# Patient Record
Sex: MALE | URBAN - METROPOLITAN AREA
[De-identification: names, ages, dates, MRNs, and addresses within clinical notes are randomized per-mention and may not be internally consistent; named-entity substitution may affect disease eponyms.]

---

## 2019-08-19 ENCOUNTER — OFFICE VISIT (OUTPATIENT)
Dept: CARDIOLOGY CLINIC | Age: 69
End: 2019-08-19

## 2019-08-19 VITALS
HEART RATE: 83 BPM | SYSTOLIC BLOOD PRESSURE: 167 MMHG | HEIGHT: 66 IN | DIASTOLIC BLOOD PRESSURE: 84 MMHG | OXYGEN SATURATION: 98 % | BODY MASS INDEX: 24.75 KG/M2 | WEIGHT: 154 LBS

## 2019-08-19 DIAGNOSIS — E78.5 DYSLIPIDEMIA: ICD-10-CM

## 2019-08-19 DIAGNOSIS — I42.9 CARDIOMYOPATHY, UNSPECIFIED TYPE (HCC): Primary | ICD-10-CM

## 2019-08-19 DIAGNOSIS — I10 ESSENTIAL HYPERTENSION: ICD-10-CM

## 2019-08-19 DIAGNOSIS — Z79.01 WARFARIN ANTICOAGULATION: ICD-10-CM

## 2019-08-19 DIAGNOSIS — Z76.89 ESTABLISHING CARE WITH NEW DOCTOR, ENCOUNTER FOR: ICD-10-CM

## 2019-08-19 DIAGNOSIS — Z95.810 CARDIAC RESYNCHRONIZATION THERAPY DEFIBRILLATOR (CRT-D) IN PLACE: ICD-10-CM

## 2019-08-19 DIAGNOSIS — I48.0 PAROXYSMAL ATRIAL FIBRILLATION (HCC): ICD-10-CM

## 2019-08-19 RX ORDER — METOPROLOL SUCCINATE 50 MG/1
TABLET, EXTENDED RELEASE ORAL DAILY
COMMUNITY

## 2019-08-19 RX ORDER — LISINOPRIL 20 MG/1
TABLET ORAL DAILY
COMMUNITY

## 2019-08-19 RX ORDER — WARFARIN SODIUM 5 MG/1
5 TABLET ORAL DAILY
COMMUNITY
End: 2019-10-09 | Stop reason: ALTCHOICE

## 2019-08-19 RX ORDER — SPIRONOLACTONE 25 MG/1
TABLET ORAL DAILY
COMMUNITY

## 2019-08-19 NOTE — PATIENT INSTRUCTIONS
Patsy Krabbe will call to schedule your testing within 48 hours.      If you do not hear from her, then please call central scheduling at [phone] or [phone] [facility]    All testing/lab work is completed at [facility] [address] [place] [address]

## 2019-08-19 NOTE — PROGRESS NOTES
1. Have you been to the ER, urgent care clinic since your last visit? Hospitalized since your last visit? No    2. Have you seen or consulted any other health care providers outside of the [address] [place] [address] since your last visit? Include any pap smears or colon screening.  No

## 2019-08-24 PROBLEM — E78.5 DYSLIPIDEMIA: Status: ACTIVE | Noted: 2019-08-24

## 2019-08-24 PROBLEM — Z79.01 WARFARIN ANTICOAGULATION: Status: ACTIVE | Noted: 2019-08-24

## 2019-08-24 PROBLEM — I48.0 PAROXYSMAL ATRIAL FIBRILLATION (HCC): Status: ACTIVE | Noted: 2019-08-24

## 2019-08-24 PROBLEM — Z95.810 CARDIAC RESYNCHRONIZATION THERAPY DEFIBRILLATOR (CRT-D) IN PLACE: Status: ACTIVE | Noted: 2019-08-24

## 2019-08-24 PROBLEM — I10 ESSENTIAL HYPERTENSION: Status: ACTIVE | Noted: 2019-08-24

## 2019-08-24 NOTE — PROGRESS NOTES
Subjective:      Semaj Tristan is in the office today for cardiac evaluation. He has recently relocated to [place] with his wife's senior care in [place]. He is a 69-year-old man that has a history of permanent pacemaker implanted in 2008 which was upgraded to a ICD/CRT biventricular device in 2016. Most of the records are pending. Patient reports no history of prior myocardial infarction. He has had no chest pain or shortness of breath. He denies peripheral swelling. He has had no palpitations, near-syncope or syncope. He has no specific complaints in the office today. Patient's cardiac risk factors are dyslipidemia, questionable hypertension per patient. Patient Active Problem List    Diagnosis Date Noted    Cardiac resynchronization therapy defibrillator (CRT-D) in place 08/24/2019    Paroxysmal atrial fibrillation ([facility] [address] 08/24/2019    Essential hypertension 08/24/2019    Dyslipidemia 08/24/2019    Warfarin anticoagulation 08/24/2019     Current Outpatient Medications   Medication Sig Dispense Refill    lisinopril (PRINIVIL, ZESTRIL) 20 mg tablet Take  by mouth daily.  metoprolol succinate (TOPROL-XL) 50 mg XL tablet Take  by mouth daily.  spironolactone (ALDACTONE) 25 mg tablet Take  by mouth daily.  warfarin (COUMADIN) 5 mg tablet Take 5 mg by mouth daily. No Known Allergies  No past medical history on file. No past surgical history on file. No family history on file.   Social History     Tobacco Use   Smoking Status Never Smoker   Smokeless Tobacco Never Used          Review of Systems, additional:  Constitutional: negative  Eyes: negative  Respiratory: negative  Cardiovascular: negative  Gastrointestinal: negative  Musculoskeletal:negative  Neurological: negative  Behvioral/Psych: negative  Endocrine: negative  ENT: negative    Objective:     Visit Vitals  /84   Pulse 83   Ht 5' 6\" (1.676 m)   Wt 69.9 kg (154 lb)   SpO2 98%   BMI 24.86 kg/m²     General: alert, cooperative, no distress   Chest Wall: inspection normal - no chest wall deformities or tenderness, respiratory effort normal   Lung: clear to auscultation bilaterally   Heart:  normal rate and regular rhythm, S1 and S2 normal, no murmurs noted, no gallops noted, no JVD   Abdomen: soft, non-tender. Bowel sounds normal. No masses,  no organomegaly   Extremities: extremities normal, atraumatic, no cyanosis or edema Skin: no rashes   Neuro: alert, oriented, normal speech, no focal findings or movement disorder noted     EK2019. Ventricular paced, tracking intrinsic atrial activity    Assessment/Plan:       ICD-10-CM ICD-9-CM    1. Cardiomyopathy, unspecified type [facility]), records pending. Patient reports history of \"congestive heart failure \". Will order echocardiogram.  Return 4 to 6 weeks I42.9 425.4 ECHO ADULT COMPLETE   2. Establishing care with new doctor, encounter for Z76.89 V65.8 AMB POC EKG ROUTINE W/ 12 LEADS, INTER & REP   3. Cardiac resynchronization therapy defibrillator (CRT-D) in place, upgraded in  from previous pacemaker of . Will arrange interrogation Z95.810 V45.02    4. Paroxysmal atrial fibrillation (HCC),[id number] score 3, AT/AF noted in prior ICD checks I48.0 427.31    5. Dyslipidemia, to be followed by PCP E78.5 272.4    6. Essential hypertension, elevated systolic BP in the office today. I10 401.9    7.  Warfarin anticoagulation Z79.01 V58.61

## 2019-10-09 ENCOUNTER — CLINICAL SUPPORT (OUTPATIENT)
Dept: CARDIOLOGY CLINIC | Age: 69
End: 2019-10-09

## 2019-10-09 ENCOUNTER — OFFICE VISIT (OUTPATIENT)
Dept: CARDIOLOGY CLINIC | Age: 69
End: 2019-10-09

## 2019-10-09 VITALS
OXYGEN SATURATION: 98 % | HEART RATE: 73 BPM | BODY MASS INDEX: 24.86 KG/M2 | WEIGHT: 154 LBS | DIASTOLIC BLOOD PRESSURE: 81 MMHG | SYSTOLIC BLOOD PRESSURE: 151 MMHG

## 2019-10-09 DIAGNOSIS — I48.0 PAROXYSMAL ATRIAL FIBRILLATION (HCC): ICD-10-CM

## 2019-10-09 DIAGNOSIS — Z95.810 CARDIAC RESYNCHRONIZATION THERAPY DEFIBRILLATOR (CRT-D) IN PLACE: ICD-10-CM

## 2019-10-09 DIAGNOSIS — I42.9 CARDIOMYOPATHY, UNSPECIFIED TYPE (HCC): Primary | ICD-10-CM

## 2019-10-09 RX ORDER — ATORVASTATIN CALCIUM 40 MG/1
TABLET, FILM COATED ORAL DAILY
COMMUNITY

## 2019-10-09 NOTE — PROGRESS NOTES
1. Have you been to the ER, urgent care clinic since your last visit? Hospitalized since your last visit? No     2. Have you seen or consulted any other health care providers outside of the [address] [place] [address] since your last visit? Include any pap smears or colon screening.   No

## 2019-10-13 NOTE — PROGRESS NOTES
Subjective:      John Real is in the office today for cardiac evaluation. He has recently relocated to [place] after his wife's recent intermediate in [place]. He is a 49-year-old man that has a history of permanent pacemaker implanted in 2008 which was upgraded to a ICD/CRT [employer] biventricular device in 2016. The patient did bring a copy of his device paraphernalia today. In the office today, the patient says he feels \"all right\". He walks in his large yard for exercise. He keeps up with all of the yard work. He does so without limiting symptoms. He denies shortness of breath, PND or orthopnea. He has had no peripheral swelling. He has had no chest pain. Patient's cardiac risk factors are dyslipidemia, questionable hypertension per patient. Patient Active Problem List    Diagnosis Date Noted    Cardiac resynchronization therapy defibrillator (CRT-D) in place 08/24/2019    Paroxysmal atrial fibrillation ([facility] [address] 08/24/2019    Essential hypertension 08/24/2019    Dyslipidemia 08/24/2019    Warfarin anticoagulation 08/24/2019     Current Outpatient Medications   Medication Sig Dispense Refill    apixaban (ELIQUIS) 5 mg tablet Take 5 mg by mouth two (2) times a day.  atorvastatin (LIPITOR) 40 mg tablet Take  by mouth daily.  lisinopril (PRINIVIL, ZESTRIL) 20 mg tablet Take  by mouth daily.  metoprolol succinate (TOPROL-XL) 50 mg XL tablet Take  by mouth daily.  spironolactone (ALDACTONE) 50 mg tablet Take  by mouth daily. No Known Allergies  No past medical history on file. No past surgical history on file. No family history on file.   Social History     Tobacco Use   Smoking Status Never Smoker   Smokeless Tobacco Never Used          Review of Systems, additional:  Constitutional: negative  Eyes: negative  Respiratory: negative  Cardiovascular: negative  Gastrointestinal: negative  Musculoskeletal:negative  Neurological: negative  Behvioral/Psych: negative  Endocrine: negative  ENT: negative    Objective:     Visit Vitals  /81   Pulse 73   Wt 69.9 kg (154 lb)   SpO2 98%   BMI 24.86 kg/m²     General:  alert, cooperative, no distress   Chest Wall: inspection normal - no chest wall deformities or tenderness, respiratory effort normal   Lung: clear to auscultation bilaterally   Heart:  normal rate and regular rhythm, S1 and S2 normal, no murmurs noted, no gallops noted, no JVD   Abdomen: soft, non-tender. Bowel sounds normal. No masses,  no organomegaly   Extremities: extremities normal, atraumatic, no cyanosis or edema Skin: no rashes   Neuro: alert, oriented, normal speech, no focal findings or movement disorder noted     EK2019. Ventricular paced, tracking intrinsic atrial activity    Assessment/Plan:       ICD-10-CM ICD-9-CM    1. Cardiomyopathy, unspecified type [facility]), records pending. Patient reports history of \"congestive heart failure \". Echocardiogram ordered and completed on 2019. Ejection fraction 40%. Grade 2 diastolic dysfunction. Return in 6 months I42.9 425.4 ECHO ADULT COMPLETE   2. Establishing care with new doctor, encounter for Z76.89 V65.8 AMB POC EKG ROUTINE W/ 12 LEADS, INTER & REP   3. Cardiac resynchronization therapy defibrillator (CRT-D) in place, upgraded in  from previous pacemaker of . [place] [employer] device interrogated in the office today. Patient has known AT/AF and is on Coumadin therapy Z95.810 V45.02    4. Paroxysmal atrial fibrillation (HCC),[id number] score 3, AT/AF noted in prior ICD checks I48.0 427.31    5. Dyslipidemia, to be followed by PCP. Lipid profile 2019; cholesterol 293, , triglycerides 109, and HDL 66. The patient is on atorvastatin 40 mg daily. Would consider doubling of dosage or switch to Crestor. E78.5 272.4    6. Essential hypertension, mildly elevated systolic BP in the office today. I10 401.9    7.  Warfarin anticoagulation Z79.01 V58.61

## 2020-10-14 ENCOUNTER — OFFICE VISIT (OUTPATIENT)
Dept: CARDIOLOGY CLINIC | Age: 70
End: 2020-10-14
Payer: MEDICARE

## 2020-10-14 VITALS
BODY MASS INDEX: 24.86 KG/M2 | DIASTOLIC BLOOD PRESSURE: 72 MMHG | SYSTOLIC BLOOD PRESSURE: 120 MMHG | OXYGEN SATURATION: 98 % | HEART RATE: 67 BPM | TEMPERATURE: 97.7 F | WEIGHT: 154 LBS

## 2020-10-14 DIAGNOSIS — I42.9 CARDIOMYOPATHY, UNSPECIFIED TYPE (HCC): ICD-10-CM

## 2020-10-14 DIAGNOSIS — R06.09 DOE (DYSPNEA ON EXERTION): Primary | ICD-10-CM

## 2020-10-14 DIAGNOSIS — I48.0 PAROXYSMAL ATRIAL FIBRILLATION (HCC): ICD-10-CM

## 2020-10-14 PROCEDURE — G8536 NO DOC ELDER MAL SCRN: HCPCS | Performed by: INTERNAL MEDICINE

## 2020-10-14 PROCEDURE — 99214 OFFICE O/P EST MOD 30 MIN: CPT | Performed by: INTERNAL MEDICINE

## 2020-10-14 PROCEDURE — G8754 DIAS BP LESS 90: HCPCS | Performed by: INTERNAL MEDICINE

## 2020-10-14 PROCEDURE — G8752 SYS BP LESS 140: HCPCS | Performed by: INTERNAL MEDICINE

## 2020-10-14 PROCEDURE — G8420 CALC BMI NORM PARAMETERS: HCPCS | Performed by: INTERNAL MEDICINE

## 2020-10-14 PROCEDURE — G8427 DOCREV CUR MEDS BY ELIG CLIN: HCPCS | Performed by: INTERNAL MEDICINE

## 2020-10-14 PROCEDURE — G8510 SCR DEP NEG, NO PLAN REQD: HCPCS | Performed by: INTERNAL MEDICINE

## 2020-10-14 NOTE — PROGRESS NOTES
Cecelia Marr presents today for   Chief Complaint   Patient presents with   [address] preferred language for health care discussion is english/other. Is someone accompanying this pt? no  Is the patient using any DME equipment during [address]? no    Depression Screening:  3 most recent PHQ Screens 10/9/2019   Little interest or pleasure in doing things Not at all   Feeling down, depressed, irritable, or hopeless Not at all   Total Score PHQ 2 0       Learning Assessment:  Learning Assessment 8/19/2019   PRIMARY LEARNER Patient   PRIMARY LANGUAGE ENGLISH   LEARNER PREFERENCE PRIMARY DEMONSTRATION   ANSWERED BY pt   RELATIONSHIP SELF       Abuse Screening:  Completed    Fall Risk  Fall Risk Assessment, last 12 mths 10/9/2019   Able to walk? Yes   Fall in past 12 months? No       Pt currently taking Anticoagulant therapy? no    Coordination of Care:  1. Have you been to the ER, urgent care clinic since your last visit? Hospitalized since your last visit? no    2. Have you seen or consulted any other health care providers outside of the [address] [place] [address] [place] since your last visit? Include any pap smears or colon screening.  no

## 2020-10-14 NOTE — PATIENT INSTRUCTIONS
Testing Echo Please call [facility] scheduling at [phone]  to schedule an appointment. All testing is completed at [address] [facility] TO BE COMPLETED IN [facility] 245[facility] before next appointment

## 2020-10-25 NOTE — PROGRESS NOTES
Subjective:      Vijay Velasco is in the office today for cardiac re-evaluation. He  relocated to [place] after his wife's  nursing home in [place] last year. He is a 66-year-old man that has a history of permanent pacemaker implant in 2008 which was upgraded to a ICD/CRT [employer] biventricular device in 2016. In the office today, the patient says he is feeling all right. He walks in his large yard for exercise. He mows the grass. He does so without limiting symptoms. He denies shortness of breath, PND or orthopnea. He has had no peripheral swelling. He has had no chest pain. He has had no palpitations. Patient's cardiac risk factors are dyslipidemia, questionable hypertension per patient. Patient Active Problem List    Diagnosis Date Noted    Cardiac resynchronization therapy defibrillator (CRT-D) in place 08/24/2019    Paroxysmal atrial fibrillation ([facility] [address] 08/24/2019    Essential hypertension 08/24/2019    Dyslipidemia 08/24/2019    Warfarin anticoagulation 08/24/2019     Current Outpatient Medications   Medication Sig Dispense Refill    apixaban (ELIQUIS) 5 mg tablet Take 5 mg by mouth two (2) times a day.  atorvastatin (LIPITOR) 80 mg tablet Take  by mouth daily.  lisinopril (PRINIVIL, ZESTRIL) 20 mg tablet Take  by mouth daily.  metoprolol succinate (TOPROL-XL) 50 mg XL tablet Take  by mouth daily.  spironolactone (ALDACTONE) 50 mg tablet Take  by mouth daily. No Known Allergies  No past medical history on file. No past surgical history on file. No family history on file.   Social History     Tobacco Use   Smoking Status Never Smoker   Smokeless Tobacco Never Used          Review of Systems, additional:  Constitutional: negative  Eyes: negative  Respiratory: negative  Cardiovascular: negative  Gastrointestinal: negative  Musculoskeletal:negative  Neurological: negative  Behvioral/Psych: negative  Endocrine: negative  ENT: negative    Objective:     Visit Vitals  /72   Pulse 67   Temp 97.7 °F (36.5 °C) (Temporal)   Wt 154 lb (69.9 kg)   SpO2 98%   BMI 24.86 kg/m²     General:  alert, cooperative, no distress   Chest Wall: inspection normal - no chest wall deformities or tenderness, respiratory effort normal   Lung: clear to auscultation bilaterally   Heart:  normal rate and regular rhythm, S1 and S2 normal, no murmurs noted, no gallops noted, no JVD   Abdomen: soft, non-tender. Bowel sounds normal. No masses,  no organomegaly   Extremities: extremities normal, atraumatic, no cyanosis or edema Skin: no rashes   Neuro: alert, oriented, normal speech, no focal findings or movement disorder noted     EK2019. Ventricular paced, tracking intrinsic atrial activity    Assessment/Plan:       ICD-10-CM ICD-9-CM    1. Cardiomyopathy, unspecified type [facility]), records pending. Patient reports history of \"congestive heart failure \". Echocardiogram ordered and completed on 2019. Ejection fraction 40%. Grade 2 diastolic dysfunction. Will repeat echocardiogram prior to next visit in 6 months  I42.9 425.4 ECHO ADULT COMPLETE          2. Cardiac resynchronization therapy defibrillator (CRT-D) in place, upgraded in  from previous pacemaker of . [employer] device. Patient has known AT/AF and is on Eliquis  Z95.810 V45.02    3. Paroxysmal atrial fibrillation (HCC),[id number] score 3, AT/AF noted in prior ICD checks I48.0 427.31    4. Dyslipidemia,  followed by PCP. Lipid profile 2019; cholesterol 293, , triglycerides 109, and HDL 66.  Patient on Lipitor 80 mg daily E78.5 272.4    5. Essential hypertension, controlled  BP in the office today.  I10 401.9

## 2020-11-17 ENCOUNTER — CLINICAL SUPPORT (OUTPATIENT)
Dept: CARDIOLOGY CLINIC | Age: 70
End: 2020-11-17
Payer: MEDICARE

## 2020-11-17 DIAGNOSIS — I42.9 CARDIOMYOPATHY, UNSPECIFIED TYPE (HCC): Primary | ICD-10-CM

## 2020-11-17 DIAGNOSIS — Z95.810 CARDIAC RESYNCHRONIZATION THERAPY DEFIBRILLATOR (CRT-D) IN PLACE: ICD-10-CM

## 2020-11-17 PROCEDURE — 93284 PRGRMG EVAL IMPLANTABLE DFB: CPT | Performed by: INTERNAL MEDICINE

## 2021-05-12 ENCOUNTER — OFFICE VISIT (OUTPATIENT)
Dept: CARDIOLOGY CLINIC | Age: 71
End: 2021-05-12
Payer: MEDICARE

## 2021-05-12 VITALS
OXYGEN SATURATION: 98 % | RESPIRATION RATE: 16 BRPM | TEMPERATURE: 98 F | SYSTOLIC BLOOD PRESSURE: 140 MMHG | HEIGHT: 66 IN | WEIGHT: 153 LBS | DIASTOLIC BLOOD PRESSURE: 79 MMHG | BODY MASS INDEX: 24.59 KG/M2 | HEART RATE: 98 BPM

## 2021-05-12 DIAGNOSIS — R06.09 DOE (DYSPNEA ON EXERTION): Primary | ICD-10-CM

## 2021-05-12 DIAGNOSIS — I42.9 CARDIOMYOPATHY, UNSPECIFIED TYPE (HCC): ICD-10-CM

## 2021-05-12 PROCEDURE — G8510 SCR DEP NEG, NO PLAN REQD: HCPCS | Performed by: INTERNAL MEDICINE

## 2021-05-12 PROCEDURE — 3017F COLORECTAL CA SCREEN DOC REV: CPT | Performed by: INTERNAL MEDICINE

## 2021-05-12 PROCEDURE — G8427 DOCREV CUR MEDS BY ELIG CLIN: HCPCS | Performed by: INTERNAL MEDICINE

## 2021-05-12 PROCEDURE — 99214 OFFICE O/P EST MOD 30 MIN: CPT | Performed by: INTERNAL MEDICINE

## 2021-05-12 PROCEDURE — G8536 NO DOC ELDER MAL SCRN: HCPCS | Performed by: INTERNAL MEDICINE

## 2021-05-12 PROCEDURE — G8754 DIAS BP LESS 90: HCPCS | Performed by: INTERNAL MEDICINE

## 2021-05-12 PROCEDURE — G8753 SYS BP > OR = 140: HCPCS | Performed by: INTERNAL MEDICINE

## 2021-05-12 PROCEDURE — G8420 CALC BMI NORM PARAMETERS: HCPCS | Performed by: INTERNAL MEDICINE

## 2021-05-12 PROCEDURE — 1101F PT FALLS ASSESS-DOCD LE1/YR: CPT | Performed by: INTERNAL MEDICINE

## 2021-05-12 NOTE — PROGRESS NOTES
Sarah Salomon presents today for   Chief Complaint   Patient presents with   [address] 56 preferred language for health care discussion is english/other. Personal Protective Equipment:   Personal Protective Equipment was used including: mask-surgical and hands-gloves. Patient was placed on no precaution(s). Patient was masked. Precautions:   Patient currently on None  Patient currently roomed with door closed    Is someone accompanying this pt? no    Is the patient using any DME equipment during [address]? no    Depression Screening:  3 most recent PHQ Screens 5/12/2021   Little interest or pleasure in doing things Not at all   Feeling down, depressed, irritable, or hopeless Not at all   Total Score PHQ 2 0       Learning Assessment:  Learning Assessment 8/19/2019   PRIMARY LEARNER Patient   PRIMARY LANGUAGE ENGLISH   LEARNER PREFERENCE PRIMARY DEMONSTRATION   ANSWERED BY pt   RELATIONSHIP SELF       Abuse Screening:  Abuse Screening Questionnaire 10/14/2020   Do you ever feel afraid of your partner? N   Are you in a relationship with someone who physically or mentally threatens you? N   Is it safe for you to go home? Y       Fall Risk  Fall Risk Assessment, last 12 mths 5/12/2021   Able to walk? Yes   Fall in past 12 months? 0   Do you feel unsteady? 0   Are you worried about falling 0       Pt currently taking Anticoagulant therapy? no    Coordination of Care:  1. Have you been to the ER, urgent care clinic since your last visit? Hospitalized since your last visit? no    2. Have you seen or consulted any other health care providers outside of the [address] [place] [address] since your last visit? Include any pap smears or colon screening.   yes

## 2021-05-12 NOTE — LETTER
5/12/2021 11:46 AM 
 
Mr. Adrienne Lo [address] [facility] [address] [address] Adrienne Wagner was seen in our office on 5/12/21 for cardiac evaluation for surgery. From a cardiac standpoint he low risk. Patient  may stop eliquis for three (3) days. Please feel free to contact our office if you have any questions regarding this patient. Sincerely, Kenzie Cuba MD

## 2021-05-17 NOTE — PROGRESS NOTES
Subjective:      Carolyn Goldstein is in the office today for cardiac re-evaluation. He  relocated to [facility] after his wife's  custodial in [place] last year. He is a 61-year-old man that has a history of permanent pacemaker implant in 2008 which was upgraded to a ICD/CRT [employer] biventricular device in 2016. In the office today, the patient says he has had no chest pain or exertional dyspnea. He has had no PND or orthopnea. He has had no peripheral swelling. He has had no palpitations, near-syncope or syncope. He will walks daily without stopping. He mows his own lawn. He does these things without limiting dyspnea. Patient's cardiac risk factors are dyslipidemia, questionable hypertension per patient. Patient Active Problem List    Diagnosis Date Noted    Cardiac resynchronization therapy defibrillator (CRT-D) in place 08/24/2019    Paroxysmal atrial fibrillation ([facility] [address] 08/24/2019    Essential hypertension 08/24/2019    Dyslipidemia 08/24/2019    Warfarin anticoagulation 08/24/2019     Current Outpatient Medications   Medication Sig Dispense Refill    apixaban (ELIQUIS) 5 mg tablet Take 5 mg by mouth two (2) times a day.  atorvastatin (LIPITOR) 40 mg tablet Take  by mouth daily.  lisinopril (PRINIVIL, ZESTRIL) 20 mg tablet Take  by mouth daily.  metoprolol succinate (TOPROL-XL) 50 mg XL tablet Take  by mouth daily.  spironolactone (ALDACTONE) 25 mg tablet Take  by mouth daily. No Known Allergies  No past medical history on file. No past surgical history on file. No family history on file.   Social History     Tobacco Use   Smoking Status Never Smoker   Smokeless Tobacco Never Used          Review of Systems, additional:  Constitutional: negative  Eyes: negative  Respiratory: negative  Cardiovascular: negative  Gastrointestinal: negative  Musculoskeletal:negative  Neurological: negative  Behvioral/Psych: negative  Endocrine: negative  ENT: negative    Objective:     Visit Vitals  BP (!) 140/79 (BP 1 Location: Left upper arm, BP Patient Position: Sitting, BP Cuff Size: Large adult)   Pulse 98   Temp 98 °F (36.7 °C) (Temporal)   Resp 16   Ht 5' 6\" (1.676 m)   Wt 69.4 kg (153 lb)   SpO2 98%   BMI 24.69 kg/m²     General:  alert, cooperative, no distress   Chest Wall: inspection normal - no chest wall deformities or tenderness, respiratory effort normal   Lung: clear to auscultation bilaterally   Heart:  normal rate and regular rhythm, S1 and S2 normal, no murmurs noted, no gallops noted, no JVD   Abdomen: soft, non-tender. Bowel sounds normal. No masses,  no organomegaly   Extremities: extremities normal, atraumatic, no cyanosis or edema Skin: no rashes   Neuro: alert, oriented, normal speech, no focal findings or movement disorder noted     EK2019. Ventricular paced, tracking intrinsic atrial activity    Assessment/Plan:       ICD-10-CM ICD-9-CM    1. Cardiomyopathy, unspecified type [facility]), records pending. Patient reports history of \"congestive heart failure \". Echocardiogram ordered and completed on 2019. Ejection fraction 40%. Grade 2 diastolic dysfunction. He was supposed to be scheduled for an echocardiogram prior to this visit. Will order echo prior to next visit in 6 months. I42.9 425.4 ECHO ADULT COMPLETE          2. Cardiac resynchronization therapy defibrillator (CRT-D) in place, last interrogation 2020. [employer] device. AT/AF less than 1%. Longevity 7 years Z95.810 V45.02    3. Paroxysmal atrial fibrillation (HCC),[id number] score 3, AT/AF noted in prior ICD checks I48.0 427.31    4. Dyslipidemia,  followed by PCP. Patient on Lipitor 80 mg daily E78.5 272.4    5. Essential hypertension, controlled  BP in the office today. I10 401.9           6. Preoperative clearance, no cardiac contraindication to proposed cataract surgery.   Patient may be off Eliquis for 3 days prior to the procedure

## 2021-06-15 ENCOUNTER — CLINICAL SUPPORT (OUTPATIENT)
Dept: CARDIOLOGY CLINIC | Age: 71
End: 2021-06-15
Payer: MEDICARE

## 2021-06-15 DIAGNOSIS — I42.9 CARDIOMYOPATHY, UNSPECIFIED TYPE (HCC): ICD-10-CM

## 2021-06-15 DIAGNOSIS — Z95.810 CARDIAC RESYNCHRONIZATION THERAPY DEFIBRILLATOR (CRT-D) IN PLACE: Primary | ICD-10-CM

## 2021-06-15 PROCEDURE — 93284 PRGRMG EVAL IMPLANTABLE DFB: CPT | Performed by: INTERNAL MEDICINE

## 2021-11-12 ENCOUNTER — OFFICE VISIT (OUTPATIENT)
Dept: CARDIOLOGY CLINIC | Age: 71
End: 2021-11-12
Payer: MEDICARE

## 2021-11-12 VITALS
HEIGHT: 66 IN | TEMPERATURE: 97.9 F | DIASTOLIC BLOOD PRESSURE: 65 MMHG | SYSTOLIC BLOOD PRESSURE: 118 MMHG | OXYGEN SATURATION: 98 % | WEIGHT: 151 LBS | BODY MASS INDEX: 24.27 KG/M2 | HEART RATE: 62 BPM

## 2021-11-12 DIAGNOSIS — I48.0 PAROXYSMAL ATRIAL FIBRILLATION (HCC): Primary | ICD-10-CM

## 2021-11-12 PROCEDURE — G8752 SYS BP LESS 140: HCPCS | Performed by: INTERNAL MEDICINE

## 2021-11-12 PROCEDURE — G8432 DEP SCR NOT DOC, RNG: HCPCS | Performed by: INTERNAL MEDICINE

## 2021-11-12 PROCEDURE — G8420 CALC BMI NORM PARAMETERS: HCPCS | Performed by: INTERNAL MEDICINE

## 2021-11-12 PROCEDURE — G8536 NO DOC ELDER MAL SCRN: HCPCS | Performed by: INTERNAL MEDICINE

## 2021-11-12 PROCEDURE — 1101F PT FALLS ASSESS-DOCD LE1/YR: CPT | Performed by: INTERNAL MEDICINE

## 2021-11-12 PROCEDURE — 99214 OFFICE O/P EST MOD 30 MIN: CPT | Performed by: INTERNAL MEDICINE

## 2021-11-12 PROCEDURE — G8754 DIAS BP LESS 90: HCPCS | Performed by: INTERNAL MEDICINE

## 2021-11-12 PROCEDURE — G8427 DOCREV CUR MEDS BY ELIG CLIN: HCPCS | Performed by: INTERNAL MEDICINE

## 2021-11-12 PROCEDURE — 3017F COLORECTAL CA SCREEN DOC REV: CPT | Performed by: INTERNAL MEDICINE

## 2021-11-12 NOTE — PROGRESS NOTES
Magda Schultz presents today for   Chief Complaint   Patient presents with    Follow-up     6 month       Is someone accompanying this pt? no    Is the patient using any DME equipment during OV? no    Depression Screening:  3 most recent PHQ Screens 5/12/2021   Little interest or pleasure in doing things Not at all   Feeling down, depressed, irritable, or hopeless Not at all   Total Score PHQ 2 0       Learning Assessment:  Learning Assessment 8/19/2019   PRIMARY LEARNER Patient   PRIMARY LANGUAGE ENGLISH   LEARNER PREFERENCE PRIMARY DEMONSTRATION   ANSWERED BY pt   RELATIONSHIP SELF       Abuse Screening:  Abuse Screening Questionnaire 10/14/2020   Do you ever feel afraid of your partner? N   Are you in a relationship with someone who physically or mentally threatens you? N   Is it safe for you to go home? Y       Fall Risk  Fall Risk Assessment, last 12 mths 11/12/2021   Able to walk? Yes   Fall in past 12 months? 0   Do you feel unsteady? 0   Are you worried about falling 0       OPIOID RISK TOOL  No flowsheet data found. Coordination of Care:  1. Have you been to the ER, urgent care clinic since your last visit? Cataract Surgery July 7th  Hospitalized since your last visit? no    2. Have you seen or consulted any other health care providers outside of the [address] [place] [address] since your last visit? no Include any pap smears or colon screening.  no

## 2021-11-20 NOTE — PROGRESS NOTES
Subjective:      Merari Licea is in the office today for cardiac re-evaluation. He  relocated to [place] after his wife's  group home in [place] last year. He is a 64-year-old man that has a history of permanent pacemaker implant in 2008 which was upgraded to a ICD/CRT [employer] [employer] biventricular device in 2016. In the office today, the patient says he has had no chest pain or exertional dyspnea. He reports that he is doing well. He walks around the park near his house and in his backyard regularly. He has had no chest pain or shortness of breath. He has had no peripheral swelling. He recently underwent his cataract extraction without complication. He has no specific complaints in the office today. Patient Active Problem List    Diagnosis Date Noted    Cardiac resynchronization therapy defibrillator (CRT-D) in place 08/24/2019    Paroxysmal atrial fibrillation ([facility] [address] 08/24/2019    Essential hypertension 08/24/2019    Dyslipidemia 08/24/2019    Warfarin anticoagulation 08/24/2019     Current Outpatient Medications   Medication Sig Dispense Refill    apixaban (ELIQUIS) 5 mg tablet Take 5 mg by mouth two (2) times a day.  atorvastatin (LIPITOR) 40 mg tablet Take  by mouth daily.  lisinopril (PRINIVIL, ZESTRIL) 20 mg tablet Take  by mouth daily.  metoprolol succinate (TOPROL-XL) 50 mg XL tablet Take  by mouth daily.  spironolactone (ALDACTONE) 25 mg tablet Take  by mouth daily. No Known Allergies  No past medical history on file. No past surgical history on file. No family history on file.   Social History     Tobacco Use   Smoking Status Never Smoker   Smokeless Tobacco Never Used          Review of Systems, additional:  Constitutional: negative  Eyes: negative  Respiratory: negative  Cardiovascular: negative  Gastrointestinal: negative  Musculoskeletal:negative  Neurological: negative  Behvioral/Psych: negative  Endocrine: negative  ENT: negative    Objective: Visit Vitals  /65 (BP 1 Location: Left arm, BP Patient Position: Sitting, BP Cuff Size: Adult)   Pulse 62   Temp 97.9 °F (36.6 °C) (Temporal)   Ht 5' 6\" (1.676 m)   Wt 68.5 kg (151 lb)   SpO2 98%   BMI 24.37 kg/m²     General:  alert, cooperative, no distress   Chest Wall: inspection normal - no chest wall deformities or tenderness, respiratory effort normal   Lung: clear to auscultation bilaterally   Heart:  normal rate and regular rhythm, S1 and S2 normal, no murmurs noted, no gallops noted, no JVD   Abdomen: soft, non-tender. Bowel sounds normal. No masses,  no organomegaly   Extremities: extremities normal, atraumatic, no cyanosis or edema Skin: no rashes   Neuro: alert, oriented, normal speech, no focal findings or movement disorder noted     EK2019. Ventricular paced, tracking intrinsic atrial activity    Assessment/Plan:       ICD-10-CM ICD-9-CM    1. Cardiomyopathy, unspecified type [facility]), records pending. Patient reports history of \"congestive heart failure \". Echocardiogram ordered and completed on 2019. Ejection fraction 40%. Grade 2 diastolic dysfunction. Echocardiogram repeated on 6/10/2021. EF 45 to 50%. Moderate AI. Mild MR. Continue present cardiac Rx and will see patient back in 6 months I42.9 425.4 ECHO ADULT COMPLETE          2. Cardiac resynchronization therapy defibrillator (CRT-D) in place, last interrogation 6/15/2021. [employer] device. AT/AF less than 1%. Longevity 6.5 years Z95.810 V45.02    3. Paroxysmal atrial fibrillation (HCC),[id number] score 3, AT/AF noted in prior ICD checks. Patient on Eliquis 5 mg twice daily for stroke prevention I48.0 427.31    4. Dyslipidemia,  followed by PCP. Patient on Lipitor 80 mg daily E78.5 272.4    5. Essential hypertension, controlled  BP in the office today. I10 401.9           6. Preoperative clearance, patient had cataract surgery without complication.

## 2022-03-18 PROBLEM — E78.5 DYSLIPIDEMIA: Status: ACTIVE | Noted: 2019-08-24

## 2022-03-18 PROBLEM — Z95.810 CARDIAC RESYNCHRONIZATION THERAPY DEFIBRILLATOR (CRT-D) IN PLACE: Status: ACTIVE | Noted: 2019-08-24

## 2022-03-19 PROBLEM — Z79.01 WARFARIN ANTICOAGULATION: Status: ACTIVE | Noted: 2019-08-24

## 2022-03-19 PROBLEM — I48.0 PAROXYSMAL ATRIAL FIBRILLATION (HCC): Status: ACTIVE | Noted: 2019-08-24

## 2022-03-19 PROBLEM — I10 ESSENTIAL HYPERTENSION: Status: ACTIVE | Noted: 2019-08-24

## 2022-05-13 ENCOUNTER — OFFICE VISIT (OUTPATIENT)
Dept: CARDIOLOGY CLINIC | Age: 72
End: 2022-05-13
Payer: MEDICARE

## 2022-05-13 VITALS
HEART RATE: 69 BPM | WEIGHT: 150.6 LBS | BODY MASS INDEX: 24.31 KG/M2 | TEMPERATURE: 98.1 F | OXYGEN SATURATION: 97 % | SYSTOLIC BLOOD PRESSURE: 144 MMHG | DIASTOLIC BLOOD PRESSURE: 72 MMHG

## 2022-05-13 DIAGNOSIS — I48.0 PAROXYSMAL ATRIAL FIBRILLATION (HCC): Primary | ICD-10-CM

## 2022-05-13 PROCEDURE — G8536 NO DOC ELDER MAL SCRN: HCPCS | Performed by: INTERNAL MEDICINE

## 2022-05-13 PROCEDURE — 3017F COLORECTAL CA SCREEN DOC REV: CPT | Performed by: INTERNAL MEDICINE

## 2022-05-13 PROCEDURE — G8427 DOCREV CUR MEDS BY ELIG CLIN: HCPCS | Performed by: INTERNAL MEDICINE

## 2022-05-13 PROCEDURE — G8753 SYS BP > OR = 140: HCPCS | Performed by: INTERNAL MEDICINE

## 2022-05-13 PROCEDURE — G8510 SCR DEP NEG, NO PLAN REQD: HCPCS | Performed by: INTERNAL MEDICINE

## 2022-05-13 PROCEDURE — 99214 OFFICE O/P EST MOD 30 MIN: CPT | Performed by: INTERNAL MEDICINE

## 2022-05-13 PROCEDURE — 1101F PT FALLS ASSESS-DOCD LE1/YR: CPT | Performed by: INTERNAL MEDICINE

## 2022-05-13 PROCEDURE — G8420 CALC BMI NORM PARAMETERS: HCPCS | Performed by: INTERNAL MEDICINE

## 2022-05-13 PROCEDURE — G8754 DIAS BP LESS 90: HCPCS | Performed by: INTERNAL MEDICINE

## 2022-05-13 NOTE — PROGRESS NOTES
Identified pt with two pt identifiers(name and ). Reviewed record in preparation for visit and have obtained necessary documentation. Willy Martino presents today for No chief complaint on file. Pt denies DIZZINESS, SOB, CHEST PAIN/ PRESSURE, FATIGUE/WEAKNESS, HEADACHES, SWELLING. Willy Martino preferred language for health care discussion is english/other. Personal Protective Equipment:   Personal Protective Equipment was used including: mask-surgical and hands-gloves. Patient was placed on no precaution(s). Patient was masked. Precautions:   Patient currently on None  Patient currently roomed with door closed. Is someone accompanying this pt? No     Is the patient using any DME equipment during OV? No     Depression Screening:  3 most recent PHQ Screens 2021   Little interest or pleasure in doing things Not at all   Feeling down, depressed, irritable, or hopeless Not at all   Total Score PHQ 2 0       Learning Assessment:  Learning Assessment 2019   PRIMARY LEARNER Patient   PRIMARY LANGUAGE ENGLISH   LEARNER PREFERENCE PRIMARY DEMONSTRATION   ANSWERED BY pt   RELATIONSHIP SELF       Abuse Screening:  Abuse Screening Questionnaire 10/14/2020   Do you ever feel afraid of your partner? N   Are you in a relationship with someone who physically or mentally threatens you? N   Is it safe for you to go home? Y       Fall Risk  Fall Risk Assessment, last 12 mths 2021   Able to walk? Yes   Fall in past 12 months? 0   Do you feel unsteady? 0   Are you worried about falling 0       Pt currently taking Anticoagulant therapy? yes  Pt currently taking Antiplatelet therapy? No     Coordination of Care:  1. Have you been to the ER, urgent care clinic since your last visit? Hospitalized since your last visit? no    2. Have you seen or consulted any other health care providers outside of the [address] [place] [address] since your last visit?  Include any pap smears or colon screening. Yes. Please see Red banners under Allergies and Med Rec to remove outside inquires. All correct information has been verified with patient and added to chart.      Medication's patient's would liked removed has been marked not taking to be removed per Verbal order and read back per Nancy Adhikari MD

## 2022-05-17 NOTE — PROGRESS NOTES
Subjective:      Paulette Moore is in the office today for cardiac re-evaluation. He  relocated to [place] after his wife's  senior living in [place]  in 2020. He is a 64-year-old man that has a history of permanent pacemaker implant in 2008 which was upgraded to a ICD/CRT [employer] biventricular device in 2016. In the office today, the patient says he has had no chest pain. His breathing has been [place] \". He walks around his yard for exercise. He sleeps \"like a baby \". He has had no peripheral swelling. He has had no palpitations, and dizziness, near-syncope or syncope. Patient Active Problem List    Diagnosis Date Noted    Cardiac resynchronization therapy defibrillator (CRT-D) in place 08/24/2019    Paroxysmal atrial fibrillation ([facility] [address] 08/24/2019    Essential hypertension 08/24/2019    Dyslipidemia 08/24/2019    Warfarin anticoagulation 08/24/2019     Current Outpatient Medications   Medication Sig Dispense Refill    apixaban (ELIQUIS) 5 mg tablet Take 5 mg by mouth two (2) times a day.  atorvastatin (LIPITOR) 40 mg tablet Take  by mouth daily.  lisinopril (PRINIVIL, ZESTRIL) 20 mg tablet Take  by mouth daily.  metoprolol succinate (TOPROL-XL) 50 mg XL tablet Take  by mouth daily.  spironolactone (ALDACTONE) 25 mg tablet Take  by mouth daily. No Known Allergies  No past medical history on file. No past surgical history on file. No family history on file.   Social History     Tobacco Use   Smoking Status Never Smoker   Smokeless Tobacco Never Used          Review of Systems, additional:  Constitutional: negative  Eyes: negative  Respiratory: negative  Cardiovascular: negative  Gastrointestinal: negative  Musculoskeletal:negative  Neurological: negative  Behvioral/Psych: negative  Endocrine: negative  ENT: negative    Objective:     Visit Vitals  BP (!) 144/72   Pulse 69   Temp 98.1 °F (36.7 °C) (Temporal)   Wt 68.3 kg (150 lb 9.6 oz)   SpO2 97%   BMI 24.31 kg/m² General:  alert, cooperative, no distress   Chest Wall: inspection normal - no chest wall deformities or tenderness, respiratory effort normal   Lung: clear to auscultation bilaterally   Heart:  normal rate and regular rhythm, S1 and S2 normal, no murmurs noted, no gallops noted, no JVD   Abdomen: soft, non-tender. Bowel sounds normal. No masses,  no organomegaly   Extremities: extremities normal, atraumatic, no cyanosis or edema Skin: no rashes   Neuro: alert, oriented, normal speech, no focal findings or movement disorder noted     EK2019. Ventricular paced, tracking intrinsic atrial activity    Assessment/Plan:       ICD-10-CM ICD-9-CM    1. Cardiomyopathy, unspecified type [facility]), records pending. Patient reports history of \"congestive heart failure \". Echocardiogram ordered and completed on 2019. Ejection fraction 40%. Grade 2 diastolic dysfunction. Echocardiogram repeated on 6/10/2021. EF 45 to 50%. Moderate AI. Mild MR. Continue present cardiac Rx and will see in return  in 6 months I42.9 425.4 ECHO ADULT COMPLETE          2. Cardiac resynchronization therapy defibrillator (CRT-D) in place, last interrogation 6/15/2021. [employer] device. AT/AF less than 1%. Longevity 6.5 years Z95.810 V45.02    3. Paroxysmal atrial fibrillation (HCC),[id number] score 3, AT/AF noted in prior ICD checks. Patient on Eliquis 5 mg twice daily for stroke prevention I48.0 427.31    4. Dyslipidemia,  followed by PCP. Patient on Lipitor 80 mg daily E78.5 272.4    5. Essential hypertension, mildly elevated systolic BP in the office today. I10 401.9           6. Preoperative clearance, patient had cataract surgery without complication.

## 2022-06-21 ENCOUNTER — CLINICAL SUPPORT (OUTPATIENT)
Dept: CARDIOLOGY CLINIC | Age: 72
End: 2022-06-21
Payer: MEDICARE

## 2022-06-21 DIAGNOSIS — Z95.810 PRESENCE OF BIVENTRICULAR AICD: ICD-10-CM

## 2022-06-21 DIAGNOSIS — I42.9 CARDIOMYOPATHY, UNSPECIFIED TYPE (HCC): Primary | ICD-10-CM

## 2022-06-21 PROCEDURE — 93289 INTERROG DEVICE EVAL HEART: CPT | Performed by: INTERNAL MEDICINE

## 2022-10-04 NOTE — PROGRESS NOTES
Short episodes of A. fib noted, on Eliquis. I have personally seen and evaluated the device findings. Interrogation reviewed and I agree with assessment.     Tati Andrew

## 2022-11-09 ENCOUNTER — OFFICE VISIT (OUTPATIENT)
Dept: CARDIOLOGY CLINIC | Age: 72
End: 2022-11-09
Payer: MEDICARE

## 2022-11-09 VITALS
SYSTOLIC BLOOD PRESSURE: 121 MMHG | OXYGEN SATURATION: 99 % | DIASTOLIC BLOOD PRESSURE: 66 MMHG | BODY MASS INDEX: 23.57 KG/M2 | HEART RATE: 76 BPM | WEIGHT: 146 LBS | TEMPERATURE: 98.6 F

## 2022-11-09 DIAGNOSIS — I42.9 CARDIOMYOPATHY, UNSPECIFIED TYPE (HCC): Primary | ICD-10-CM

## 2022-11-09 PROCEDURE — 99214 OFFICE O/P EST MOD 30 MIN: CPT | Performed by: INTERNAL MEDICINE

## 2022-11-09 PROCEDURE — G8752 SYS BP LESS 140: HCPCS | Performed by: INTERNAL MEDICINE

## 2022-11-09 PROCEDURE — G8420 CALC BMI NORM PARAMETERS: HCPCS | Performed by: INTERNAL MEDICINE

## 2022-11-09 PROCEDURE — 1101F PT FALLS ASSESS-DOCD LE1/YR: CPT | Performed by: INTERNAL MEDICINE

## 2022-11-09 PROCEDURE — 3074F SYST BP LT 130 MM HG: CPT | Performed by: INTERNAL MEDICINE

## 2022-11-09 PROCEDURE — G8427 DOCREV CUR MEDS BY ELIG CLIN: HCPCS | Performed by: INTERNAL MEDICINE

## 2022-11-09 PROCEDURE — 3017F COLORECTAL CA SCREEN DOC REV: CPT | Performed by: INTERNAL MEDICINE

## 2022-11-09 PROCEDURE — 1123F ACP DISCUSS/DSCN MKR DOCD: CPT | Performed by: INTERNAL MEDICINE

## 2022-11-09 PROCEDURE — G8754 DIAS BP LESS 90: HCPCS | Performed by: INTERNAL MEDICINE

## 2022-11-09 PROCEDURE — G8536 NO DOC ELDER MAL SCRN: HCPCS | Performed by: INTERNAL MEDICINE

## 2022-11-09 PROCEDURE — 3078F DIAST BP <80 MM HG: CPT | Performed by: INTERNAL MEDICINE

## 2022-11-09 PROCEDURE — G8510 SCR DEP NEG, NO PLAN REQD: HCPCS | Performed by: INTERNAL MEDICINE

## 2022-11-09 NOTE — LETTER
11/9/2022 9:30 AM    Mr. Maya France  [facility] [id number]      To Whom it May Concern,    Maya France was seen in our office on November 9, 2022 for cardiac evaluation. From a cardiac standpoint he is low to intermediate risk for colonoscopy. It is my recommendation that he hold his Eliquis two days prior to his procedure. Mr. Tia Pacheco did take his Eliquis this morning, however I have advised him to hold further doses until after his procedure. Please feel free to contact our office if you have any questions regarding this patient.        Sincerely,      Kady Marcos MD

## 2022-11-09 NOTE — PROGRESS NOTES
Identified pt with two pt identifiers(name and ). Reviewed record in preparation for visit and have obtained necessary documentation. Kavitha Hollingsworth presents today for   Chief Complaint   Patient presents with    Follow-up     6m       Pt c/o DIZZINESS, SOB, CHEST PAIN/ PRESSURE, FATIGUE/WEAKNESS, HEADACHES, SWELLING. Kavitha Hollingsworth preferred language for health care discussion is english/other. Personal Protective Equipment:   Personal Protective Equipment was used including: mask-surgical and hands-gloves. Patient was placed on no precaution(s). Patient was masked. Precautions:   Patient currently on None  Patient currently roomed with door closed. Is someone accompanying this pt? no    Is the patient using any DME equipment during [address]? no    Depression Screening:  3 most recent PHQ Screens 2022   Little interest or pleasure in doing things Not at all   Feeling down, depressed, irritable, or hopeless Not at all   Total Score PHQ 2 0       Learning Assessment:  Learning Assessment 2019   PRIMARY LEARNER Patient   PRIMARY LANGUAGE ENGLISH   LEARNER PREFERENCE PRIMARY DEMONSTRATION   ANSWERED BY pt   RELATIONSHIP SELF       Abuse Screening:  Abuse Screening Questionnaire 10/14/2020   Do you ever feel afraid of your partner? N   Are you in a relationship with someone who physically or mentally threatens you? N   Is it safe for you to go home? Y       Fall Risk  Fall Risk Assessment, last 12 mths 2022   Able to walk? Yes   Fall in past 12 months? 0   Do you feel unsteady? 0   Are you worried about falling 0       Pt currently taking Anticoagulant therapy? no  Pt currently taking Antiplatelet therapy? yes    Coordination of Care:  1. Have you been to the ER, urgent care clinic since your last visit? Hospitalized since your last visit? no    2. Have you seen or consulted any other health care providers outside of the [address] [place] [address] since your last visit?  Include any pap smears or colon screening. GI      Please see Red banners under Allergies and Med Rec to remove outside inquires. All correct information has been verified with patient and added to chart.      Medication's patient's would liked removed has been marked not taking to be removed per Verbal order and read back per Jacob Lopez MD

## 2022-11-21 NOTE — PROGRESS NOTES
Subjective:      Melly Galloway is in the office today for cardiac re-evaluation. He  relocated to [place] after his wife's  [facility] in [place]  in 2020. He is a 77-year-old man that has a history of permanent pacemaker implant in 2008 which was upgraded to a ICD/CRT [employer] biventricular device in 2016. In the office today, the patient says he has had no chest pain or shortness of breath. He reports that he walks around his yard for exercise. He has had no peripheral swelling. He has had no palpitations, and dizziness, near-syncope or syncope. He informs me that he has recently been discovered to have blood in his stool. He is scheduled for endoscopy and/or colonoscopy. Patient Active Problem List    Diagnosis Date Noted    Cardiac resynchronization therapy defibrillator (CRT-D) in place 08/24/2019    Paroxysmal atrial fibrillation ([facility] [address] 08/24/2019    Essential hypertension 08/24/2019    Dyslipidemia 08/24/2019    Warfarin anticoagulation 08/24/2019     Current Outpatient Medications   Medication Sig Dispense Refill    apixaban (ELIQUIS) 5 mg tablet Take 5 mg by mouth two (2) times a day. atorvastatin (LIPITOR) 40 mg tablet Take  by mouth daily. lisinopril (PRINIVIL, ZESTRIL) 20 mg tablet Take  by mouth daily. metoprolol succinate (TOPROL-XL) 50 mg XL tablet Take  by mouth daily. spironolactone (ALDACTONE) 25 mg tablet Take  by mouth daily. No Known Allergies  No past medical history on file. No past surgical history on file. No family history on file.   Social History     Tobacco Use   Smoking Status Never   Smokeless Tobacco Never          Review of Systems, additional:  Constitutional: negative  Eyes: negative  Respiratory: negative  Cardiovascular: negative  Gastrointestinal: negative  Musculoskeletal:negative  Neurological: negative  Behvioral/Psych: negative  Endocrine: negative  ENT: negative    Objective:     Visit Vitals  /66   Pulse 76   Temp 98.6 °F (37 °C) (Temporal)   Wt 66.2 kg (146 lb)   SpO2 99%   BMI 23.57 kg/m²     General:  alert, cooperative, no distress   Chest Wall: inspection normal - no chest wall deformities or tenderness, respiratory effort normal   Lung: clear to auscultation bilaterally   Heart:  normal rate and regular rhythm, S1 and S2 normal, no murmurs noted, no gallops noted, no JVD   Abdomen: soft, non-tender. Bowel sounds normal. No masses,  no organomegaly   Extremities: extremities normal, atraumatic, no cyanosis or edema Skin: no rashes   Neuro: alert, oriented, normal speech, no focal findings or movement disorder noted     EK2019. Ventricular paced, tracking intrinsic atrial activity    Assessment/Plan:       ICD-10-CM ICD-9-CM    1. Cardiomyopathy, unspecified type [facility]), records pending. Patient reports history of \"congestive heart failure \". Echocardiogram ordered and completed on 2019. Ejection fraction 40%. Grade 2 diastolic dysfunction. Echocardiogram repeated on 6/10/2021. EF 45 to 50%. Moderate AI. Mild MR. Symptoms remain stable. Continue present cardiac Rx and will see in return  in 6 months I42.9 425.4 ECHO ADULT COMPLETE          2. Cardiac resynchronization therapy defibrillator (CRT-D) in place, last interrogation 2022. [employer] device. AT/AF less than 1%. Longevity 5.5 years Z95.810 V45.02    3. Paroxysmal atrial fibrillation (HCC),[id number] score 3, AT/AF noted in prior ICD checks. Patient on Eliquis 5 mg twice daily for stroke prevention I48.0 427.31    4. Dyslipidemia,  followed by PCP. Patient on Lipitor 80 mg daily E78.5 272.4    5. Essential hypertension, mildly elevated systolic BP in the office today. I10 401.9           6. Pre procedural clearance, no cardiac contraindication for endoscopy and/or colonoscopy.

## 2022-12-20 ENCOUNTER — CLINICAL SUPPORT (OUTPATIENT)
Dept: CARDIOLOGY CLINIC | Age: 72
End: 2022-12-20
Payer: MEDICARE

## 2022-12-20 DIAGNOSIS — I42.9 CARDIOMYOPATHY, UNSPECIFIED TYPE (HCC): Primary | ICD-10-CM

## 2022-12-20 DIAGNOSIS — Z95.810 PRESENCE OF BIVENTRICULAR AICD: ICD-10-CM

## 2022-12-20 PROCEDURE — 93289 INTERROG DEVICE EVAL HEART: CPT | Performed by: INTERNAL MEDICINE

## 2023-02-01 NOTE — PROGRESS NOTES
I have personally seen and evaluated the device findings. Interrogation reviewed and I agree with assessment.     Jorge Celeste

## 2023-06-14 ENCOUNTER — OFFICE VISIT (OUTPATIENT)
Age: 73
End: 2023-06-14

## 2023-06-14 VITALS
HEART RATE: 100 BPM | DIASTOLIC BLOOD PRESSURE: 62 MMHG | BODY MASS INDEX: 22.11 KG/M2 | WEIGHT: 137 LBS | OXYGEN SATURATION: 98 % | SYSTOLIC BLOOD PRESSURE: 108 MMHG

## 2023-06-14 DIAGNOSIS — I48.0 PAROXYSMAL ATRIAL FIBRILLATION (HCC): Primary | ICD-10-CM

## 2023-06-14 ASSESSMENT — PATIENT HEALTH QUESTIONNAIRE - PHQ9
SUM OF ALL RESPONSES TO PHQ QUESTIONS 1-9: 0
2. FEELING DOWN, DEPRESSED OR HOPELESS: 0
1. LITTLE INTEREST OR PLEASURE IN DOING THINGS: 0
SUM OF ALL RESPONSES TO PHQ QUESTIONS 1-9: 0
SUM OF ALL RESPONSES TO PHQ QUESTIONS 1-9: 0
SUM OF ALL RESPONSES TO PHQ9 QUESTIONS 1 & 2: 0
SUM OF ALL RESPONSES TO PHQ QUESTIONS 1-9: 0

## 2024-05-22 ENCOUNTER — OFFICE VISIT (OUTPATIENT)
Age: 74
End: 2024-05-22
Payer: MEDICARE

## 2024-05-22 VITALS
SYSTOLIC BLOOD PRESSURE: 130 MMHG | OXYGEN SATURATION: 98 % | WEIGHT: 154 LBS | DIASTOLIC BLOOD PRESSURE: 72 MMHG | HEART RATE: 63 BPM | BODY MASS INDEX: 24.75 KG/M2 | HEIGHT: 66 IN

## 2024-05-22 DIAGNOSIS — I48.0 PAROXYSMAL ATRIAL FIBRILLATION (HCC): ICD-10-CM

## 2024-05-22 DIAGNOSIS — Z95.810 PRESENCE OF AUTOMATIC (IMPLANTABLE) CARDIAC DEFIBRILLATOR: ICD-10-CM

## 2024-05-22 DIAGNOSIS — I42.0 DILATED CARDIOMYOPATHY (HCC): Primary | ICD-10-CM

## 2024-05-22 PROBLEM — R91.8 MULTIPLE PULMONARY NODULES: Status: ACTIVE | Noted: 2024-05-22

## 2024-05-22 PROBLEM — C20 RECTAL CANCER (HCC): Status: ACTIVE | Noted: 2024-05-22

## 2024-05-22 PROBLEM — C20 PRIMARY MALIGNANT NEOPLASM OF RECTUM (HCC): Status: ACTIVE | Noted: 2024-05-22

## 2024-05-22 PROBLEM — D63.0 ANEMIA IN NEOPLASTIC DISEASE: Status: ACTIVE | Noted: 2024-05-22

## 2024-05-22 PROBLEM — N17.9 ACUTE RENAL FAILURE (HCC): Status: ACTIVE | Noted: 2024-05-22

## 2024-05-22 PROBLEM — M10.9 GOUT: Status: ACTIVE | Noted: 2024-05-22

## 2024-05-22 PROBLEM — D70.2 DRUG-INDUCED NEUTROPENIA (HCC): Status: ACTIVE | Noted: 2024-05-22

## 2024-05-22 PROCEDURE — 3075F SYST BP GE 130 - 139MM HG: CPT | Performed by: INTERNAL MEDICINE

## 2024-05-22 PROCEDURE — 1123F ACP DISCUSS/DSCN MKR DOCD: CPT | Performed by: INTERNAL MEDICINE

## 2024-05-22 PROCEDURE — 3078F DIAST BP <80 MM HG: CPT | Performed by: INTERNAL MEDICINE

## 2024-05-22 PROCEDURE — 99214 OFFICE O/P EST MOD 30 MIN: CPT | Performed by: INTERNAL MEDICINE

## 2024-05-22 RX ORDER — SPIRONOLACTONE 50 MG/1
50 TABLET, FILM COATED ORAL DAILY
COMMUNITY

## 2024-05-22 ASSESSMENT — PATIENT HEALTH QUESTIONNAIRE - PHQ9
SUM OF ALL RESPONSES TO PHQ QUESTIONS 1-9: 0
SUM OF ALL RESPONSES TO PHQ9 QUESTIONS 1 & 2: 0
SUM OF ALL RESPONSES TO PHQ QUESTIONS 1-9: 0
1. LITTLE INTEREST OR PLEASURE IN DOING THINGS: NOT AT ALL
2. FEELING DOWN, DEPRESSED OR HOPELESS: NOT AT ALL

## 2024-05-22 ASSESSMENT — ANXIETY QUESTIONNAIRES
7. FEELING AFRAID AS IF SOMETHING AWFUL MIGHT HAPPEN: NOT AT ALL
6. BECOMING EASILY ANNOYED OR IRRITABLE: NOT AT ALL
1. FEELING NERVOUS, ANXIOUS, OR ON EDGE: NOT AT ALL
5. BEING SO RESTLESS THAT IT IS HARD TO SIT STILL: NOT AT ALL
4. TROUBLE RELAXING: NOT AT ALL
GAD7 TOTAL SCORE: 0
2. NOT BEING ABLE TO STOP OR CONTROL WORRYING: NOT AT ALL
3. WORRYING TOO MUCH ABOUT DIFFERENT THINGS: NOT AT ALL

## 2024-05-22 NOTE — PROGRESS NOTES
Tacho Tidwell presents today for   Chief Complaint   Patient presents with    Follow-up     11 month       Tacho Tidwell preferred language for health care discussion is english/other.    Is someone accompanying this pt? no    Is the patient using any DME equipment during OV? no    Depression Screening:  Depression: Not at risk (5/22/2024)    PHQ-2     PHQ-2 Score: 0        Learning Assessment:  Who is the primary learner? Patient    What is the preferred language for health care of the primary learner? ENGLISH    How does the primary learner prefer to learn new concepts? DEMONSTRATION    Answered By patient    Relationship to Learner SELF           Pt currently taking Anticoagulant therapy? Eliquis 5 mg bid    Pt currently taking Antiplatelet therapy ? no      Coordination of Care:  1. Have you been to the ER, urgent care clinic since your last visit? Hospitalized since your last visit? no    2. Have you seen or consulted any other health care providers outside of the [facility] since your last visit? Include any pap smears or colon screening. no

## 2024-05-22 NOTE — PROGRESS NOTES
Tacho Tidwell    Chief Complaint   Patient presents with    Follow-up     11 month       HPI    Tacho Tidwell is a 73 y.o. here for overdue follow up of pAFib and ICD.    He  relocated to [place] after his wife's  [facility] in [place]  in 2020.  He is a 72-year-old man that has a history of permanent pacemaker implant in 2008 which was upgraded to a ICD/CRT [employer] biventricular device in 2016.      In the office today, the patient reports that he was diagnosed with rectal cancer.  He has had 5 weeks of radiation.  He has had 5 treatments of chemo.  He denies chest pain or shortness of breath.  He is taking Eliquis for stroke prevention.    He has no cardiac complaints but frustrated his device hasn't been checked.    History reviewed. No pertinent past medical history.    History reviewed. No pertinent surgical history.    Current Outpatient Medications   Medication Sig Dispense Refill    spironolactone (ALDACTONE) 50 MG tablet Take 1 tablet by mouth daily      apixaban (ELIQUIS) 5 MG TABS tablet Take 1 tablet by mouth 2 times daily      atorvastatin (LIPITOR) 40 MG tablet Take by mouth daily      lisinopril (PRINIVIL;ZESTRIL) 20 MG tablet Take by mouth daily      metoprolol succinate (TOPROL XL) 50 MG extended release tablet Take by mouth daily       No current facility-administered medications for this visit.       No Known Allergies    Social History     Socioeconomic History    Marital status:      Spouse name: Not on file    Number of children: Not on file    Years of education: Not on file    Highest education level: Not on file   Occupational History    Not on file   Tobacco Use    Smoking status: Never    Smokeless tobacco: Never   Vaping Use    Vaping Use: Never used   Substance and Sexual Activity    Alcohol use: Not on file    Drug use: Not on file    Sexual activity: Not on file   Other Topics Concern    Not on file   Social History Narrative    Not on file     Social

## 2024-08-26 ENCOUNTER — OFFICE VISIT (OUTPATIENT)
Age: 74
End: 2024-08-26
Payer: MEDICARE

## 2024-08-26 VITALS
HEIGHT: 66 IN | OXYGEN SATURATION: 99 % | BODY MASS INDEX: 24.86 KG/M2 | DIASTOLIC BLOOD PRESSURE: 64 MMHG | HEART RATE: 60 BPM | SYSTOLIC BLOOD PRESSURE: 125 MMHG

## 2024-08-26 DIAGNOSIS — I48.0 PAROXYSMAL ATRIAL FIBRILLATION (HCC): Primary | ICD-10-CM

## 2024-08-26 PROCEDURE — 3078F DIAST BP <80 MM HG: CPT | Performed by: INTERNAL MEDICINE

## 2024-08-26 PROCEDURE — 3074F SYST BP LT 130 MM HG: CPT | Performed by: INTERNAL MEDICINE

## 2024-08-26 PROCEDURE — 1123F ACP DISCUSS/DSCN MKR DOCD: CPT | Performed by: INTERNAL MEDICINE

## 2024-08-26 PROCEDURE — 93000 ELECTROCARDIOGRAM COMPLETE: CPT | Performed by: INTERNAL MEDICINE

## 2024-08-26 PROCEDURE — 99214 OFFICE O/P EST MOD 30 MIN: CPT | Performed by: INTERNAL MEDICINE

## 2024-08-26 ASSESSMENT — PATIENT HEALTH QUESTIONNAIRE - PHQ9
SUM OF ALL RESPONSES TO PHQ QUESTIONS 1-9: 0
1. LITTLE INTEREST OR PLEASURE IN DOING THINGS: NOT AT ALL
SUM OF ALL RESPONSES TO PHQ QUESTIONS 1-9: 0
SUM OF ALL RESPONSES TO PHQ QUESTIONS 1-9: 0
2. FEELING DOWN, DEPRESSED OR HOPELESS: NOT AT ALL
SUM OF ALL RESPONSES TO PHQ9 QUESTIONS 1 & 2: 0
SUM OF ALL RESPONSES TO PHQ QUESTIONS 1-9: 0

## 2024-08-26 NOTE — PROGRESS NOTES
Identified pt with two pt identifiers(name and ). Reviewed record in preparation for visit and have obtained necessary documentation.    Tacho Tidwell presents today for   Chief Complaint   Patient presents with    Follow-up     3 months        Pt c/o no complaints      Tacho Tidwell preferred language for health care discussion is english/other.    Personal Protective Equipment:   Personal Protective Equipment was used including: mask-surgical and hands-gloves. Patient was placed on no precaution(s). Patient was not masked.    Precautions:   Patient currently on None  Patient currently roomed with door closed.    Is someone accompanying this pt? no    Is the patient using any DME equipment during OV? no    Depression Screenin/26/2024     9:35 AM 2024     9:26 AM 2023    10:45 AM 2022     9:00 AM 2022     9:42 AM 2021    10:05 AM   PHQ-9 Questionaire   Little interest or pleasure in doing things 0 0 0 0 0 0   Feeling down, depressed, or hopeless 0 0 0 0 0 0   PHQ-9 Total Score 0 0 0 0 0 0        Learning Assessment:  Who is the primary learner? Patient    What is the preferred language for health care of the primary learner? ENGLISH    How does the primary learner prefer to learn new concepts? READING    Answered By self    Relationship to Learner SELF        Abuse Screenin/26/2024     9:00 AM   AMB Abuse Screening   Do you ever feel afraid of your partner? N   Are you in a relationship with someone who physically or mentally threatens you? N   Is it safe for you to go home? Y          Fall Risk      2024     9:35 AM 2024     9:26 AM 2023    10:45 AM   Fall Risk   Do you feel unsteady or are you worried about falling?  no no no   2 or more falls in past year? no no no   Fall with injury in past year? no no no         Pt currently taking Anticoagulant /Antiplatelet therapy? Eliquis 5 mg twice a day     Coordination of Care:  1. Have you been to

## 2024-08-29 NOTE — PROGRESS NOTES
Subjective:      Tacho is in the office today for cardiac re-evaluation.  He  relocated to [place] after his wife's  [facility] in [place]  in 2020.  He is a 74-year-old man that has a history of permanent pacemaker implant in 2008 which was upgraded to a ICD/CRT [employer] biventricular device in 2016.       The patient was diagnosed with rectal cancer.  He has had radiation and chemotherapy.  He denies chest pain or shortness of breath.  He is taking Eliquis for stroke prevention.  He walks and takes laps in his yard.  He reports he is doing \"okay \".          Patient Active Problem List    Diagnosis Date Noted    Cardiac resynchronization therapy defibrillator (CRT-D) in place 08/24/2019    Paroxysmal atrial fibrillation (HCC) 08/24/2019    Essential hypertension 08/24/2019    Dyslipidemia 08/24/2019    Warfarin anticoagulation 08/24/2019     Current Outpatient Medications   Medication Sig Dispense Refill    apixaban (ELIQUIS) 5 mg tablet Take 5 mg by mouth two (2) times a day.      atorvastatin (LIPITOR) 40 mg tablet Take  by mouth daily.      lisinopril (PRINIVIL, ZESTRIL) 20 mg tablet Take  by mouth daily.      metoprolol succinate (TOPROL-XL) 50 mg XL tablet Take  by mouth daily.      spironolactone (ALDACTONE) 25 mg tablet Take  by mouth daily.       No Known Allergies  No past medical history on file.  No past surgical history on file.  No family history on file.  Social History     Tobacco Use   Smoking Status Never   Smokeless Tobacco Never          Review of Systems, additional:  Constitutional: negative  Eyes: negative  Respiratory: negative  Cardiovascular: negative  Gastrointestinal: negative  Musculoskeletal:negative  Neurological: negative  Behvioral/Psych: negative  Endocrine: negative  ENT: negative    Objective:     Visit Vitals  /64 (Site: Left Upper Arm, Position: Sitting, Cuff Size: Medium Adult)   Pulse 60   Ht 1.676 m (5' 6\")   SpO2 99%   BMI 24.86 kg/m²    General:   alert, cooperative, no distress   Chest Wall: inspection normal - no chest wall deformities or tenderness, respiratory effort normal   Lung: clear to auscultation bilaterally   Heart:  normal rate and regular rhythm, S1 and S2 normal, no murmurs noted, no gallops noted, no JVD   Abdomen: soft, non-tender. Bowel sounds normal. No masses,  no organomegaly   Extremities: extremities normal, atraumatic, no cyanosis or edema Skin: no rashes   Neuro: alert, oriented, normal speech, no focal findings or movement disorder noted     [id number].  Ventricular paced, tracking intrinsic atrial activity    Assessment/Plan:       ICD-10-CM ICD-9-CM    1. Cardiomyopathy, unspecified type (HCC), records pending.  Patient reports history of \"congestive heart failure \".  Echocardiogram ordered and completed on 2019.  Ejection fraction 40%.  Grade 2 diastolic dysfunction.  Echocardiogram repeated on 6/10/2021.  EF 45 to 50%.  Moderate AI.  Mild MR. Symptoms remain stable.  Return in 1 year.  Will have pacemaker check on schedule I42.9 425.4 ECHO ADULT COMPLETE          2. Cardiac resynchronization therapy defibrillator (CRT-D) in place, last interrogation 2024     Qbaka device.  AT/AF less than 1%.  Longevity 3.0 years Z95.810 V45.02    3. Paroxysmal atrial fibrillation (HCC),[id number] score 3, AT/AF noted in prior ICD checks.  Eliquis 5 mg twice daily therapy for short interval for bleeding.  Patient presently back on Eliquis for stroke prevention I48.0 427.31    4. Dyslipidemia,  followed by PCP.    Patient on Lipitor 40 mg daily E78.5 272.4    5. Essential hypertension, controlled BP in the office today. I10 401.9           6.      Rectal Ca, status post perirectal implants

## 2024-12-18 ENCOUNTER — NURSE ONLY (OUTPATIENT)
Age: 74
End: 2024-12-18

## 2024-12-18 DIAGNOSIS — I42.0 DILATED CARDIOMYOPATHY (HCC): ICD-10-CM

## 2024-12-18 DIAGNOSIS — Z95.810 PRESENCE OF AUTOMATIC (IMPLANTABLE) CARDIAC DEFIBRILLATOR: Primary | ICD-10-CM

## 2024-12-18 DIAGNOSIS — I48.0 PAROXYSMAL ATRIAL FIBRILLATION (HCC): ICD-10-CM

## 2025-03-19 ENCOUNTER — CLINICAL SUPPORT (OUTPATIENT)
Age: 75
End: 2025-03-19
Payer: MEDICARE

## 2025-03-19 DIAGNOSIS — Z95.810 PRESENCE OF AUTOMATIC (IMPLANTABLE) CARDIAC DEFIBRILLATOR: Primary | ICD-10-CM

## 2025-03-19 DIAGNOSIS — I42.0 DILATED CARDIOMYOPATHY (HCC): ICD-10-CM

## 2025-03-19 DIAGNOSIS — I48.0 PAROXYSMAL ATRIAL FIBRILLATION (HCC): ICD-10-CM

## 2025-03-19 PROCEDURE — 93295 DEV INTERROG REMOTE 1/2/MLT: CPT | Performed by: INTERNAL MEDICINE

## 2025-03-19 PROCEDURE — 93296 REM INTERROG EVL PM/IDS: CPT | Performed by: INTERNAL MEDICINE

## 2025-03-24 ENCOUNTER — RESULTS FOLLOW-UP (OUTPATIENT)
Age: 75
End: 2025-03-24

## 2025-03-24 NOTE — RESULT ENCOUNTER NOTE
Device check personally reviewed by me.  No clinically significant arrhythmias.  Normal device function on interrogation.  See scanned interrogation document for complete details.

## 2025-06-23 ENCOUNTER — RESULTS FOLLOW-UP (OUTPATIENT)
Age: 75
End: 2025-06-23